# Patient Record
Sex: MALE | Race: BLACK OR AFRICAN AMERICAN | NOT HISPANIC OR LATINO | Employment: OTHER | URBAN - METROPOLITAN AREA
[De-identification: names, ages, dates, MRNs, and addresses within clinical notes are randomized per-mention and may not be internally consistent; named-entity substitution may affect disease eponyms.]

---

## 2024-11-05 ENCOUNTER — HOSPITAL ENCOUNTER (EMERGENCY)
Facility: HOSPITAL | Age: 44
Discharge: HOME | End: 2024-11-05
Attending: STUDENT IN AN ORGANIZED HEALTH CARE EDUCATION/TRAINING PROGRAM

## 2024-11-05 VITALS
OXYGEN SATURATION: 99 % | WEIGHT: 182 LBS | BODY MASS INDEX: 28.56 KG/M2 | HEART RATE: 73 BPM | HEIGHT: 67 IN | RESPIRATION RATE: 16 BRPM | DIASTOLIC BLOOD PRESSURE: 95 MMHG | TEMPERATURE: 98 F | SYSTOLIC BLOOD PRESSURE: 152 MMHG

## 2024-11-05 DIAGNOSIS — B07.8 PALMAR WART: ICD-10-CM

## 2024-11-05 DIAGNOSIS — L03.011 CELLULITIS OF FINGER OF RIGHT HAND: Primary | ICD-10-CM

## 2024-11-05 PROCEDURE — 99284 EMERGENCY DEPT VISIT MOD MDM: CPT

## 2024-11-05 RX ORDER — CEPHALEXIN 500 MG/1
1000 CAPSULE ORAL 2 TIMES DAILY
Qty: 28 CAPSULE | Refills: 0 | Status: SHIPPED | OUTPATIENT
Start: 2024-11-05 | End: 2024-11-12

## 2024-11-05 RX ORDER — IBUPROFEN 800 MG/1
800 TABLET ORAL EVERY 6 HOURS PRN
Qty: 20 TABLET | Refills: 0 | Status: SHIPPED | OUTPATIENT
Start: 2024-11-05

## 2024-11-05 RX ORDER — MUPIROCIN 20 MG/G
OINTMENT TOPICAL 2 TIMES DAILY
Qty: 15 G | Refills: 0 | Status: SHIPPED | OUTPATIENT
Start: 2024-11-05

## 2024-11-05 NOTE — DISCHARGE INSTRUCTIONS
Please keep your wound clean and dry.  Wash gently with soap and water and apply antibiotic ointment (bacitracin, neosporin, etc.) over the wound after washing. Please watch for signs of infection including: increased\spreading redness, swelling, pus-like discharge, or a fever greater than 100.4F. If you experience any of these, please contact your Primary Care Doctor or Return to the Emergency Department for a wound check.     Once your wart infection has improved, you may start the salicylic acid treatment pads as directed on prescription.  Please follow up with your Primary Care Doctor and dermatology.  You may visits [facility] to establish care. You may return to the Emergency Department if you are unable to see your Primary Care Doctor.  Please return to the ER for any new or worsening symptoms.

## 2024-11-05 NOTE — ED PROVIDER NOTES
Encounter Date: 11/5/2024    SCRIBE #1 NOTE: I, Vince Desean Do, am scribing for, and in the presence of,  Iwona Chairez PA-C. I have scribed the following portions of the note - Other sections scribed: HPI, ROS, PE.       History     Chief Complaint   Patient presents with    Hand Pain     Pt arrived in ED, c/o finger pain and swelling to right hand x 4 days. Pt has callus to first digit and reports it's painful now. Pt denies any trauma. Pt denies having any other pain or complaints.      44 y.o. male with no PMHx presents for emergent evaluation of right finger pain X 4 days.  He reports enlarging callus appearing lesion to palmar aspect of the left 2nd finger over the last year but has not bothered him up until last week.  He noticed increased swelling and redness around the mass but denies any drainage.  He was never been evaluated for this in the past as he thought it was a callus or a corn and did not bother him up until recently.  Denies any recent injury but did attempt to shave down the area due to its enlarging size prior to onset of symptoms.  Denies decreased range of motion, numbness, tingling the digit.  He reports previous attempted treatment with covering this area with a small patch of duct tape, however stopped due to pain.  Has not attempted any new treatment onset of pain. Patient denies fever/chills, headache, chest pain, shortness of breath, abdominal pain, nausea/vomiting/diarrhea, urinary concerns, myalgias, or any other complaints at this time.     The history is provided by the patient. No  was used.     Review of patient's allergies indicates:  No Known Allergies  History reviewed. No pertinent past medical history.  History reviewed. No pertinent surgical history.  No family history on file.  Social History     Tobacco Use    Smoking status: Never    Smokeless tobacco: Never     Review of Systems   Constitutional:  Negative for chills and fever.   HENT:  Negative for  sore throat.    Respiratory:  Negative for shortness of breath.    Cardiovascular:  Negative for chest pain.   Gastrointestinal:  Negative for abdominal pain, diarrhea, nausea and vomiting.   Genitourinary:  Negative for decreased urine volume, dysuria, hematuria and testicular pain.   Musculoskeletal:  Negative for myalgias.   Skin:  Positive for wound. Negative for rash.   Neurological:  Negative for weakness and headaches.   Psychiatric/Behavioral: Negative.         Physical Exam     Initial Vitals [11/05/24 0651]   BP Pulse Resp Temp SpO2   (!) 171/91 73 16 98.2 °F (36.8 °C) 99 %      MAP       --         Physical Exam    Nursing note and vitals reviewed.  Constitutional: He appears well-developed and well-nourished. He is not diaphoretic. No distress.   HENT:   Head: Normocephalic and atraumatic.   Right Ear: External ear normal.   Left Ear: External ear normal.   Eyes: Conjunctivae and EOM are normal.   Neck: Neck supple.   Normal range of motion.  Cardiovascular:  Normal rate.           Pulses:       Carotid pulses are 2+ on the right side and 2+ on the left side.  Pulmonary/Chest: No stridor. No respiratory distress.   Musculoskeletal:         General: Normal range of motion.      Cervical back: Normal range of motion and neck supple.      Comments: Rough papule growth to the palmar aspect of the left 2nd middle phalang with minimal surrounding erythema. No drainage or palpable fluctuance.  Multiple pinpoint vesicles noted throughout papule undersurface skin. Full flexion and extension of the left 2nd PIP and DIP joint.  No joint effusion. 2+ radial pulses bilaterally.  No snuffbox tenderness.  Distal sensation intact.  Normal  strength     Neurological: He is alert and oriented to person, place, and time.   Skin: No rash noted.   Psychiatric: He has a normal mood and affect. Thought content normal.         ED Course   Procedures  Labs Reviewed - No data to display       Imaging Results    None           Medications - No data to display  Medical Decision Making  Patient is a afebrile, well appearing 44 y.o.  male who presents for evaluation of right finger pain. Denies injury, cyanosis, pallor, decreased strength or sensation.  On exam, rough papule growth to the palmar aspect of the left 2nd middle phalang with minimal surrounding erythema. No drainage or palpable fluctuance.  Multiple pinpoint vesicles noted throughout papule undersurface skin. Full flexion and extension of the left 2nd PIP and DIP joint.  No joint effusion. 2+ radial pulses bilaterally.  No snuffbox tenderness.  Distal sensation intact.  Normal  strength. Pulses normal. Neurovascularly intact. The patient remained comfortable and stable during their visit in the ED. Details of ED course documented in ED workup.     Differential Diagnosis includes, but is not limited to: Fracture, dislocation, cellulitis, bursitis, muscle strain, ligament tear/sprain, soft tissue contusion, osteoarthritis     All historical, clinical, and radiographic findings reviewed with the patient.  Patient's history and physical exam most consistent with palmar work but appears to be cellulitic likely from attempt self treatment.  There are no concerning features on physical exam to suggest an emergent or life threatening condition. No further intervention is indicated at this time and I am of the belief that that it is safe to discharge the patient from the emergency department.     D/c with oral and topical antibiotics.  Advised on appropriate wound care.  Once well healed, recommend salicylic acid treatment to remove wart.  Dermatology referral placed.  Patient has been counseled regarding the need for follow-up as well as the indications to return to the emergency room should new or worrisome developments (including but not limited to worsening pain, cyanosis, or loss of strength or sensation) occur. Additionally, patient instructed to follow up with PCP and with  Dermatology for recheck of today's complaints.    Discharge and follow-up instructions discussed with the patient who expressed understanding and willingness to comply with recommendations. Patient discharged from the emergency department in stable condition, in no acute distress.     Risk  OTC drugs.  Prescription drug management.  Diagnosis or treatment significantly limited by social determinants of health.            Scribe Attestation:   Scribe #1: I performed the above scribed service and the documentation accurately describes the services I performed. I attest to the accuracy of the note.                               Clinical Impression:  Final diagnoses:  [B07.8] Palmar wart  [L03.011] Cellulitis of finger of right hand (Primary)       I, Iwona Chairez PA-C, personally performed the services described in this documentation. All medical record entries made by the scribe were at my direction and in my presence. I have reviewed the chart and agree that the record reflects my personal performance and is accurate and complete.      DISCLAIMER: This note was prepared with natue voice recognition transcription software. Garbled syntax, mangled pronouns, and other bizarre constructions may be attributed to that software system.     ED Disposition Condition    Discharge Stable          ED Prescriptions       Medication Sig Dispense Start Date End Date Auth. Provider    ibuprofen (ADVIL,MOTRIN) 800 MG tablet Take 1 tablet (800 mg total) by mouth every 6 (six) hours as needed for Pain. 20 tablet 11/5/2024 -- Iwona Chairez PA-C    salicylic acid 40 % Plst Apply medicated pad directly over wart and securely firmed.  Repeat every 48 hours as needed until the wart is removed for up to 12 weeks. 18 each 11/5/2024 -- Iwona Chairez PA-C    mupirocin (BACTROBAN) 2 % ointment Apply topically 2 (two) times daily. 15 g 11/5/2024 -- Iwona Chairez PA-C    cephALEXin (KEFLEX) 500 MG capsule Take 2 capsules (1,000 mg  total) by mouth 2 (two) times a day. for 7 days 28 capsule 11/5/2024 11/12/2024 Iwona Chairez PA-C          Follow-up Information       Follow up With Specialties Details Why Contact Info    [facility] [place] - Emergency Dept Emergency Medicine Go to  For new or worsening symptoms [address]  [facility] [place] [id number] [phone]    [facility] -  Schedule an appointment as soon as possible for a visit   [address] [place] [address]  [phone]      [facility] Surgical Oncology, Orthopedic Surgery, Genetics, Physical Medicine and Rehabilitation, Occupational Therapy, Radiology   [address] [facility] [address]  [phone]               Iwona Chairez PA-C  11/05/24 1400